# Patient Record
Sex: MALE | Race: WHITE | Employment: OTHER | URBAN - METROPOLITAN AREA
[De-identification: names, ages, dates, MRNs, and addresses within clinical notes are randomized per-mention and may not be internally consistent; named-entity substitution may affect disease eponyms.]

---

## 2017-01-01 ENCOUNTER — APPOINTMENT (OUTPATIENT)
Dept: CT IMAGING | Facility: HOSPITAL | Age: 62
DRG: 177 | End: 2017-01-01
Attending: EMERGENCY MEDICINE
Payer: MEDICARE

## 2017-01-01 ENCOUNTER — HOSPITAL ENCOUNTER (INPATIENT)
Facility: HOSPITAL | Age: 62
LOS: 3 days | Discharge: HOSPICE | DRG: 177 | End: 2017-01-01
Attending: EMERGENCY MEDICINE | Admitting: FAMILY MEDICINE
Payer: MEDICARE

## 2017-01-01 ENCOUNTER — APPOINTMENT (OUTPATIENT)
Dept: GENERAL RADIOLOGY | Facility: HOSPITAL | Age: 62
DRG: 177 | End: 2017-01-01
Attending: INTERNAL MEDICINE
Payer: MEDICARE

## 2017-01-01 ENCOUNTER — APPOINTMENT (OUTPATIENT)
Dept: GENERAL RADIOLOGY | Facility: HOSPITAL | Age: 62
DRG: 177 | End: 2017-01-01
Payer: MEDICARE

## 2017-01-01 ENCOUNTER — APPOINTMENT (OUTPATIENT)
Dept: GENERAL RADIOLOGY | Facility: HOSPITAL | Age: 62
DRG: 177 | End: 2017-01-01
Attending: NURSE PRACTITIONER
Payer: MEDICARE

## 2017-01-01 VITALS
DIASTOLIC BLOOD PRESSURE: 62 MMHG | WEIGHT: 102.31 LBS | HEIGHT: 74 IN | OXYGEN SATURATION: 91 % | BODY MASS INDEX: 13.13 KG/M2 | HEART RATE: 96 BPM | RESPIRATION RATE: 20 BRPM | TEMPERATURE: 98 F | SYSTOLIC BLOOD PRESSURE: 103 MMHG

## 2017-01-01 DIAGNOSIS — E87.6 HYPOKALEMIA: ICD-10-CM

## 2017-01-01 DIAGNOSIS — J93.9 PNEUMOTHORAX, UNSPECIFIED TYPE: ICD-10-CM

## 2017-01-01 DIAGNOSIS — J18.9 PNEUMONIA OF BOTH LUNGS DUE TO INFECTIOUS ORGANISM, UNSPECIFIED PART OF LUNG: Primary | ICD-10-CM

## 2017-01-01 DIAGNOSIS — K22.3 ESOPHAGEAL PERFORATION: ICD-10-CM

## 2017-01-01 LAB
ALBUMIN SERPL-MCNC: 1.5 G/DL (ref 3.5–4.8)
ALBUMIN SERPL-MCNC: 1.6 G/DL (ref 3.5–4.8)
ALP LIVER SERPL-CCNC: 236 U/L (ref 45–117)
ALP LIVER SERPL-CCNC: 263 U/L (ref 45–117)
ALT SERPL-CCNC: 10 U/L (ref 17–63)
ALT SERPL-CCNC: 11 U/L (ref 17–63)
AST SERPL-CCNC: 14 U/L (ref 15–41)
AST SERPL-CCNC: 15 U/L (ref 15–41)
BASOPHILS # BLD AUTO: 0.02 X10(3) UL (ref 0–0.1)
BASOPHILS # BLD AUTO: 0.03 X10(3) UL (ref 0–0.1)
BASOPHILS NFR BLD AUTO: 0.1 %
BASOPHILS NFR BLD AUTO: 0.1 %
BILIRUB SERPL-MCNC: 0.5 MG/DL (ref 0.1–2)
BILIRUB SERPL-MCNC: 0.6 MG/DL (ref 0.1–2)
BUN BLD-MCNC: 12 MG/DL (ref 8–20)
BUN BLD-MCNC: 9 MG/DL (ref 8–20)
CALCIUM BLD-MCNC: 7.5 MG/DL (ref 8.3–10.3)
CALCIUM BLD-MCNC: 7.7 MG/DL (ref 8.3–10.3)
CHLORIDE: 92 MMOL/L (ref 101–111)
CHLORIDE: 96 MMOL/L (ref 101–111)
CO2: 39 MMOL/L (ref 22–32)
CO2: 39 MMOL/L (ref 22–32)
CREAT BLD-MCNC: 0.3 MG/DL (ref 0.7–1.3)
CREAT BLD-MCNC: 0.35 MG/DL (ref 0.7–1.3)
EOSINOPHIL # BLD AUTO: 0.02 X10(3) UL (ref 0–0.3)
EOSINOPHIL # BLD AUTO: 0.02 X10(3) UL (ref 0–0.3)
EOSINOPHIL NFR BLD AUTO: 0.1 %
EOSINOPHIL NFR BLD AUTO: 0.1 %
ERYTHROCYTE [DISTWIDTH] IN BLOOD BY AUTOMATED COUNT: 20 % (ref 11.5–16)
ERYTHROCYTE [DISTWIDTH] IN BLOOD BY AUTOMATED COUNT: 20.1 % (ref 11.5–16)
GLUCOSE BLD-MCNC: 129 MG/DL (ref 70–99)
GLUCOSE BLD-MCNC: 138 MG/DL (ref 70–99)
HCT VFR BLD AUTO: 24.4 % (ref 37–53)
HCT VFR BLD AUTO: 25.6 % (ref 37–53)
HGB BLD-MCNC: 7.4 G/DL (ref 13–17)
HGB BLD-MCNC: 8 G/DL (ref 13–17)
IMMATURE GRANULOCYTE COUNT: 0.37 X10(3) UL (ref 0–1)
IMMATURE GRANULOCYTE COUNT: 0.49 X10(3) UL (ref 0–1)
IMMATURE GRANULOCYTE RATIO %: 1.8 %
IMMATURE GRANULOCYTE RATIO %: 1.9 %
INR BLD: 1.21 (ref 0.89–1.12)
LACTIC ACID: 1.3 MMOL/L (ref 0.5–2)
LYMPHOCYTES # BLD AUTO: 0.6 X10(3) UL (ref 0.9–4)
LYMPHOCYTES # BLD AUTO: 0.77 X10(3) UL (ref 0.9–4)
LYMPHOCYTES NFR BLD AUTO: 3 %
LYMPHOCYTES NFR BLD AUTO: 3 %
M PROTEIN MFR SERPL ELPH: 5.3 G/DL (ref 6.1–8.3)
M PROTEIN MFR SERPL ELPH: 5.4 G/DL (ref 6.1–8.3)
MCH RBC QN AUTO: 28.7 PG (ref 27–33.2)
MCH RBC QN AUTO: 29.1 PG (ref 27–33.2)
MCHC RBC AUTO-ENTMCNC: 30.3 G/DL (ref 31–37)
MCHC RBC AUTO-ENTMCNC: 31.3 G/DL (ref 31–37)
MCV RBC AUTO: 93.1 FL (ref 80–99)
MCV RBC AUTO: 94.6 FL (ref 80–99)
MONOCYTES # BLD AUTO: 0.91 X10(3) UL (ref 0.1–0.6)
MONOCYTES # BLD AUTO: 1.13 X10(3) UL (ref 0.1–0.6)
MONOCYTES NFR BLD AUTO: 4.4 %
MONOCYTES NFR BLD AUTO: 4.5 %
NEUTROPHIL ABS PRELIM: 18.35 X10 (3) UL (ref 1.3–6.7)
NEUTROPHIL ABS PRELIM: 23.36 X10 (3) UL (ref 1.3–6.7)
NEUTROPHILS # BLD AUTO: 18.35 X10(3) UL (ref 1.3–6.7)
NEUTROPHILS # BLD AUTO: 23.36 X10(3) UL (ref 1.3–6.7)
NEUTROPHILS NFR BLD AUTO: 90.5 %
NEUTROPHILS NFR BLD AUTO: 90.5 %
PLATELET # BLD AUTO: 167 10(3)UL (ref 150–450)
PLATELET # BLD AUTO: 185 10(3)UL (ref 150–450)
PLATELET MORPHOLOGY: NORMAL
POTASSIUM SERPL-SCNC: 2.7 MMOL/L (ref 3.6–5.1)
POTASSIUM SERPL-SCNC: 2.9 MMOL/L (ref 3.6–5.1)
PSA SERPL DL<=0.01 NG/ML-MCNC: 15.7 SECONDS (ref 12.3–14.8)
RBC # BLD AUTO: 2.58 X10(6)UL (ref 4.3–5.7)
RBC # BLD AUTO: 2.75 X10(6)UL (ref 4.3–5.7)
RED CELL DISTRIBUTION WIDTH-SD: 67.8 FL (ref 35.1–46.3)
RED CELL DISTRIBUTION WIDTH-SD: 70 FL (ref 35.1–46.3)
SODIUM SERPL-SCNC: 136 MMOL/L (ref 136–144)
SODIUM SERPL-SCNC: 138 MMOL/L (ref 136–144)
WBC # BLD AUTO: 20.3 X10(3) UL (ref 4–13)
WBC # BLD AUTO: 25.8 X10(3) UL (ref 4–13)

## 2017-01-01 PROCEDURE — 71010 XR CHEST AP PORTABLE  (CPT=71010): CPT

## 2017-01-01 PROCEDURE — 71250 CT THORAX DX C-: CPT

## 2017-01-01 PROCEDURE — 99223 1ST HOSP IP/OBS HIGH 75: CPT | Performed by: INTERNAL MEDICINE

## 2017-01-01 RX ORDER — ENOXAPARIN SODIUM 100 MG/ML
40 INJECTION SUBCUTANEOUS NIGHTLY
Status: DISCONTINUED | OUTPATIENT
Start: 2017-01-01 | End: 2017-01-01

## 2017-01-01 RX ORDER — POTASSIUM CHLORIDE 14.9 MG/ML
20 INJECTION INTRAVENOUS ONCE
Status: COMPLETED | OUTPATIENT
Start: 2017-01-01 | End: 2017-01-01

## 2017-01-01 RX ORDER — SCOLOPAMINE TRANSDERMAL SYSTEM 1 MG/1
1 PATCH, EXTENDED RELEASE TRANSDERMAL
Qty: 10 PATCH | Refills: 0 | Status: SHIPPED | OUTPATIENT
Start: 2017-01-01 | End: 2017-02-05

## 2017-01-01 RX ORDER — ACETAMINOPHEN 10 MG/ML
1000 INJECTION, SOLUTION INTRAVENOUS EVERY 6 HOURS PRN
Status: DISCONTINUED | OUTPATIENT
Start: 2017-01-01 | End: 2017-01-01

## 2017-01-01 RX ORDER — IPRATROPIUM BROMIDE AND ALBUTEROL SULFATE 2.5; .5 MG/3ML; MG/3ML
3 SOLUTION RESPIRATORY (INHALATION) EVERY 4 HOURS PRN
Status: DISCONTINUED | OUTPATIENT
Start: 2017-01-01 | End: 2017-01-01

## 2017-01-01 RX ORDER — DIGOXIN 125 MCG
125 TABLET ORAL DAILY
Status: DISCONTINUED | OUTPATIENT
Start: 2017-01-01 | End: 2017-01-01

## 2017-01-01 RX ORDER — AMOXICILLIN AND CLAVULANATE POTASSIUM 875; 125 MG/1; MG/1
1 TABLET, FILM COATED ORAL 2 TIMES DAILY
Qty: 20 TABLET | Refills: 0 | Status: SHIPPED | OUTPATIENT
Start: 2017-01-01 | End: 2017-01-16

## 2017-01-01 RX ORDER — LORAZEPAM 2 MG/ML
0.5 INJECTION INTRAMUSCULAR EVERY 4 HOURS PRN
Status: DISCONTINUED | OUTPATIENT
Start: 2017-01-01 | End: 2017-01-01

## 2017-01-01 RX ORDER — SODIUM CHLORIDE 9 MG/ML
1000 INJECTION, SOLUTION INTRAVENOUS ONCE
Status: COMPLETED | OUTPATIENT
Start: 2017-01-01 | End: 2017-01-01

## 2017-01-01 RX ORDER — METOPROLOL TARTRATE 50 MG/1
50 TABLET, FILM COATED ORAL 2 TIMES DAILY
Status: DISCONTINUED | OUTPATIENT
Start: 2017-01-01 | End: 2017-01-01

## 2017-01-01 RX ORDER — POTASSIUM CHLORIDE 29.8 MG/ML
40 INJECTION INTRAVENOUS ONCE
Status: COMPLETED | OUTPATIENT
Start: 2017-01-01 | End: 2017-01-01

## 2017-01-01 RX ORDER — MORPHINE SULFATE 2 MG/ML
1 INJECTION, SOLUTION INTRAMUSCULAR; INTRAVENOUS
Status: DISCONTINUED | OUTPATIENT
Start: 2017-01-01 | End: 2017-01-01

## 2017-01-01 RX ORDER — ASPIRIN 81 MG/1
324 TABLET, CHEWABLE ORAL ONCE
Status: COMPLETED | OUTPATIENT
Start: 2017-01-01 | End: 2017-01-01

## 2017-01-01 RX ORDER — IPRATROPIUM BROMIDE AND ALBUTEROL SULFATE 2.5; .5 MG/3ML; MG/3ML
3 SOLUTION RESPIRATORY (INHALATION)
Status: DISCONTINUED | OUTPATIENT
Start: 2017-01-01 | End: 2017-01-01

## 2017-01-01 RX ORDER — LORAZEPAM 2 MG/ML
2 INJECTION INTRAMUSCULAR EVERY 4 HOURS PRN
Status: DISCONTINUED | OUTPATIENT
Start: 2017-01-01 | End: 2017-01-01

## 2017-01-01 RX ORDER — SCOLOPAMINE TRANSDERMAL SYSTEM 1 MG/1
1 PATCH, EXTENDED RELEASE TRANSDERMAL
Status: DISCONTINUED | OUTPATIENT
Start: 2017-01-01 | End: 2017-01-01

## 2017-01-01 RX ORDER — LABETALOL HYDROCHLORIDE 5 MG/ML
10 INJECTION, SOLUTION INTRAVENOUS EVERY 4 HOURS PRN
Status: DISCONTINUED | OUTPATIENT
Start: 2017-01-01 | End: 2017-01-01

## 2017-01-01 RX ORDER — DEXTROSE AND SODIUM CHLORIDE 5; .45 G/100ML; G/100ML
INJECTION, SOLUTION INTRAVENOUS CONTINUOUS
Status: DISCONTINUED | OUTPATIENT
Start: 2017-01-01 | End: 2017-01-01

## 2017-01-01 RX ORDER — POTASSIUM CHLORIDE 3 G/15ML
40 SOLUTION ORAL DAILY
Status: DISCONTINUED | OUTPATIENT
Start: 2017-01-01 | End: 2017-01-01

## 2017-01-01 RX ORDER — LORAZEPAM 2 MG/ML
1 INJECTION INTRAMUSCULAR EVERY 4 HOURS PRN
Status: DISCONTINUED | OUTPATIENT
Start: 2017-01-01 | End: 2017-01-01

## 2017-01-03 PROBLEM — K22.3 ESOPHAGEAL PERFORATION: Status: ACTIVE | Noted: 2017-01-01

## 2017-01-03 PROBLEM — J18.9 PNEUMONIA OF BOTH LUNGS DUE TO INFECTIOUS ORGANISM: Status: ACTIVE | Noted: 2017-01-01

## 2017-01-03 PROBLEM — J93.9 PNEUMOTHORAX, UNSPECIFIED TYPE: Status: ACTIVE | Noted: 2017-01-01

## 2017-01-03 PROBLEM — J18.9 PNEUMONIA OF BOTH LUNGS DUE TO INFECTIOUS ORGANISM, UNSPECIFIED PART OF LUNG: Status: ACTIVE | Noted: 2017-01-01

## 2017-01-03 NOTE — ED INITIAL ASSESSMENT (HPI)
Just dc'd from hospital last week with pneumonia. Continued cough.   Feeling increasingly more weak.  sts that he is dehydrated

## 2017-01-04 NOTE — CONSULTS
GASTROENTEROLOGY CONSULTATION  Jakob Crouch MD    Department of Gastroenterology  [address] Patient Status:  Inpatient    2/15/1955 MRN [id number]   [facility] 4SW-A Attending Linda Carlisle MD   [facility] Da the lower portion at the level of T11, spanning the central esophagus, with portions in the upper as well as lower esophagus.  In spite of the stent, the esophagus is markedly   dilated from its origin, although way to its distal aspect, especially in the u lucencies in the right mid-lower paraesophageal mediastinum, directly contiguous with the lateral wall of the esophagus, containing small densities,   which may be some retained material from the recent video swallow study.  In any case, this measures on ax pneumothorax, or measuring maximally 2.6 CM at the left lung apex. No signs of tension.   4. Widespread esophageal abnormality, including the presence of esophageal stent, but in spite of the stent there is considerable material grossly distending the esoph Relation Age of Onset   • Heart Disorder Father      mi x 2   • Other Father      pulmonary embolism   • Cancer Mother      pancreatic cancer   • Lipids Brother    • Hypertension Brother       reports that he has been smoking Cigarettes.   He has a 150 pack lymph nodes. Allergy: Denies medication allergy, latex/rubber allergy, anaphylactic or other reaction to anesthesia, food allergy. Eyes: Denies blurred/double vision, eye disease, glasses or contacts, glaucoma.   ENT: Denies nose or gums bleeding, mouth stent migration. Prognosis is very poor. Pt is a very poor endoscopic candidate. Manipulation of the stent or placement of another stent is likely to exacerbate the perforation and highly risky for further complications. Plan:  1.  Pt is agreeable to

## 2017-01-04 NOTE — PLAN OF CARE
DEATH & DYING    • Pt/Family communicate acceptance of impending death and feel psychological comfort and peace Not Progressing          COPING    • Pt/Family able to verbalize concerns and demonstrate effective coping strategies Not Progressing          G

## 2017-01-04 NOTE — CM/SW NOTE
01/04/17 1600   CM/SW Referral Data   Referral Source Physician   Reason for Referral Other  (Placement)   Readmission Assessment   Pt. received education on diagnoses at time of discharge?  Yes   Did you know who and how to call someone if you felt wors previous hospitalization Yes   Did you understand your discharge instructions? Yes   Were medications taken as indicated on discharge instructions? Yes   Pt's level of independence at discharge? Some assist (mod)   Pt's living situation prior to admission? appointment? Yes   ---- F/U appt: How many days after discharge was follow-up appointment? 0-7 days   Was full assessment completed by SW/CM on prior admission? Yes   Was the recommended discharge plan achieved? Yes   Was pt. discharged w/out services?  No

## 2017-01-04 NOTE — CONSULTS
Pulmonary / Critical Care H&P/Consult       NAME: Britt Ground - ROOM: [facility]/769-Y - MRN: [id number] - Age: 64year old - :  2/15/1955    Date of Admission: 1/3/2017  5:00 PM  Admission Diagnosis: Hypokalemia [E87.6]  Esophageal perforation [K22.3]  Pneu 10/4/2016    Comment Procedure: ESOPHAGOGASTRODUODENOSCOPY (EGD);   Surgeon: Lois Montanez MD;  Location: [facility] ENDOSCOPY        Allergies:  No Known Allergies    Social History:    Social History   Marital Status:   Spouse Name: N/A    Years of Edu (EMLA) 2.5-2.5 % External Cream Apply 1 Application topically as needed (please apply to port 1 hour prior to chemo).  Use as directed Disp: 1 Tube Rfl: 3   dexamethasone 4 MG Oral Tab Take 2 tabs with food in AM for 2 days following chemo, then as directed wall:    No tenderness or deformity   Heart:    Regular rate and rhythm, S1 and S2 normal, no murmur, rub   or gallop   Abdomen:     Soft, non-tender, bowel sounds active all four quadrants,     no masses, no organomegaly   Extremities:   Extremities julianna the perforation but might require chest tube for management. 4. Esophageal perforation: related to malignancy  - already has esophageal stent in place  - gi consulted, unclear if there is anything that can be done such as a new stent.  Doubt he is a surgi

## 2017-01-04 NOTE — CONSULTS
[address]  [id number]  Hospital Day #1  Date of Consult: 01/04/17       Reason for Consultation: Consult requested for evaluation of palliative care needs and goals of care discussion.     H acetaminophen. Review of Systems: denies complaints; no pain, nausea, dyspnea. Does endorse hunger, but states \"they won't let me eat. \"    Physical Exam:  GEN: Awake and alert, irritable.   Answers most questions appropriately, but appears to have poo Agent Appointed: No        Pre-existing DNR/DNI Order: Yes, notify physician for order  Describe Patient Wishes: DNR    Symptoms(s): denies complaints    Psychosocial: Pt is very angry and has poor insight into his prognosis.   SW to make hospice referral.

## 2017-01-04 NOTE — ED PROVIDER NOTES
Patient Seen in: [facility] Emergency Department    History   Patient presents with:  Fatigue (constitutional, neurologic)    Stated Complaint: weakness    HPI    42-year-old male presents emergency department who states he was just DC'd from the [facility] daily.   levofloxacin 25 MG/ML Oral Solution,  Take 20 mL (500 mg total) by mouth daily. Clindamycin HCl 150 MG Oral Cap,  Take 2 capsules (300 mg total) by mouth 3 (three) times daily.    Metoprolol Tartrate 50 MG Oral Tab,  Take 1 tablet (50 mg total) b 01/03/17 1723 95 %   O2 Device 01/03/17 1723 None (Room air)       Current:/64 mmHg  Pulse 82  Temp(Src) 100.1 °F (37.8 °C)  Resp 32  Ht 188 cm (6' 2\")  Wt 46.72 kg  BMI 13.22 kg/m2  SpO2 93%        Physical Exam    Vital signs reviewed  General jesús DIFFERENTIAL WITH PLATELET    Narrative: The following orders were created for panel order CBC WITH DIFFERENTIAL WITH PLATELET.   Procedure                               Abnormality         Status                     --------- Martha Garcia MD on 1/03/2017 at 18:19     Approved by: Martha Garcia MD                   Patient will have a CT scan done. His white count is significantly climbed.   Will start him on some IV antibiotics but I do feel he will need to be admitted to th In spite of the stent, the esophagus is markedly dilated from its origin, although way to its distal aspect, especially in the upper esophagus where it measures on image 31 = 6.6 x 6.6 CM AP and transverse dimensions.  The esophagus is distended by bubbly a densities, which may be some retained material from the recent video swallow study.  In any case, this measures on axial image about 5.1 x 4.2 CM, and the lateral border of this merges into a moderate-sized consolidation of the right lower lobe, as well as esophageal stent, but in spite of the stent there is considerable material grossly distending the esophageal lumen such that there is mass effect upon the trachea.  In the upper esophagus, the material is more bubbly and aerated, in the lower esophagus the the pancreatic body on the prior study is no longer present. Activity in the bowel now appears to be within physiologic normal limits. SKELETON: The hypermetabolic lesion previously seen in the right anterior fourth rib is no longer hypermetabolic.      12/ is identified. The aorta, pulmonary arteries and great vessels are stable in caliber. Mild ectasia of the ascending thoracic aorta measuring up to 3.9 cm is noted. Extensive calcified aortoiliac atherosclerosis is noted.  MEDIASTINUM/LUCIANA:  There is been in measuring up to 6 mm (image 44, series 6). PERITONEUM/MESENTERY/OMENTUM:  There is no free fluid or free air. GI TRACT:  There is no bowel obstruction. UROGENITAL STRUCTURES: Central nonspecific prostate calcifications are noted.  The bladder is mildly dist follow-up provider specified.     Medications Prescribed:  Current Discharge Medication List        Present on Admission  Date Reviewed: 12/29/2016          ICD-10-CM Noted POA    Pneumonia of both lungs due to infectious organism J18.9 1/3/2017 Unknown

## 2017-01-04 NOTE — SLP NOTE
Pt transitioning to hospice care at this time. Will sign off. Thank you.     Brandi Devi M.S. [id number] [facility]  Pager [phone]

## 2017-01-04 NOTE — PROGRESS NOTES
ICU  Critical Care APN Progress Note    NAME: Eric Salgado - ROOM: [address] [place], [place] [address] - MRN: [id number] - Age: 64year old - :2/15/1955    History Of Present Illness:  Eric Salgado is a 64year old male with PMHx significant for esophageal cancer with stent a organomegaly  Extremities: Extremities normal, atraumatic, no cyanosis or edema,capillary refill <3 sec.     Pulses: 2+ and symmetric all extremities  Skin: Skin color pale with venous changes to lower ext, dry scaly feet and hands, poor skin turgor, no suzan 01/03/2017   TP 5.3 01/03/2017   AST 14 01/03/2017   ALT 11 01/03/2017       Assessment/Plan:  1.  Perforated esophagus on right with possible abscess and 2.6 cm apical pneumothorax without evidence of tension   -GI on consult   -currently stable, monitor

## 2017-01-04 NOTE — PLAN OF CARE
GASTROINTESTINAL - ADULT    • Maintains adequate nutritional intake (undernourished) Not Progressing        Impaired Activities of Daily Living    • Achieve highest/safest level of independence in self care Not Progressing        Impaired Functional Mobili

## 2017-01-04 NOTE — PLAN OF CARE
Seen by Medical team, including Palliative care-patient has agreed to going home with hospice and comfort care. Social work and residential hospice to arrange.  I called son, Bee Colon, and updated him with this per pt request. Patient, does not want tele/spo2 mo

## 2017-01-04 NOTE — H&P
[facility] Family Medicine History and Physical     Aziza Slot Patient Status:  Inpatient    2/15/1955 MRN [id number]   [facility] 4SW-A Attending Temitope Juárez MD   Hosp Day # 1 PCP Manpreet Llanes MD       CC: Patient presen 0.0 oz/week    0 Standard drinks or equivalent per week         Comment: once a week- beer        Fam Hx  Family History   Problem Relation Age of Onset   • Heart Disorder Father      mi x 2   • Other Father      pulmonary embolism   • Cancer Mother      p ALT  11*  10*   AST  14*  15   ALB  1.6*  1.5*       Radiology:     1/3/2017  PROCEDURE:  CT OF THE CHEST WITHOUT CONTRAST  COMPARISON:  [facility] , CT ANGIOGRAPHY, CHEST (CPT=71275), 10/03/2016, 19:53. INDICATIONS:  Weakness.   TECHNIQUE:  Unenhanced multi esophagus, although assessment is somewhat difficult because of the irregularity of esophagus. Although there is no diffuse, widespread pneumomediastinum, this may reflect some pneumomediastinum localized around the margins of the esophagus.   There is defi calcified masses in the liver, gallstones, widespread arterial calcification. There are degenerative changes in the spine. Dr. Philip Garcia was notified of the findings by telephone at 9:17 PM today. 1/3/2017  CONCLUSION:   1.  Numerous significant abnormali F/E/N  · Comfort liquids due to possible esophageal perf  · Lytes per protocol    #)Prophylaxis  · SCD  · Lovenox    #)Code Status  · DNR/DNI  · Refusing hospice  · palliative care  · Long discussions (see last office note) regarding futility of further tr

## 2017-01-04 NOTE — HOSPICE RN NOTE
Per [facility] staff, pt wanted to go home today. RN Wilfredo Jenkins said that the pt's girlfriend does not want pt to return to her home that they lived together as she would be unable to care for him. Pt was informed of this and called his girlfriend.   Returned to

## 2017-01-04 NOTE — CM/SW NOTE
SW spoke to [facility] RN. She went in to see pt to discuss home hospice and pt had learned his significant other does not want him home. They are talking on the phone.   SW to remain available after hospice eval to help with dc planning if neede

## 2017-01-04 NOTE — CONSULTS
[facility]  Report of Consultation    Ferman Saint Patient Status:  Inpatient    2/15/1955 MRN [id number]   [facility] 4SW-A Attending Lindsay Winter MD   Hosp Day # 1 PCP Cady Vasquze MD     REASON FOR CONSULTATION: x 2   • Other Father      pulmonary embolism   • Cancer Mother      pancreatic cancer   • Lipids Brother    • Hypertension Brother       reports that he has been smoking Cigarettes. He has a 150 pack-year smoking history.  He has never used smokeless tobac Lab Results  Component Value Date   WBC 20.3 01/04/2017   HGB 7.4 01/04/2017   HCT 24.4 01/04/2017   .0 01/04/2017   CREATSERUM 0.30 01/04/2017   BUN 9 01/04/2017    01/04/2017   K 2.9 01/04/2017   CL 96 01/04/2017   CO2 39.0 01/04/2017 cancer [facility])     Abdominal lymphadenopathy     Thoracic lymphadenopathy     Malignant neoplasm metastatic to right lung [facility])     Liver metastases (HCC)     Bone metastases (HCC)     Psoriasis     Malignant neoplasm of overlapping sites of esophagus ([facility] [address] opinion with Dr Sri Navarro group    2. Pneumonia and Pneumonia -   3. Anemia from chemo        Thank you for allowing me to participate in the care of your patient.     Leora Fernández MD

## 2017-01-04 NOTE — PHYSICAL THERAPY NOTE
PHYSICAL THERAPY EVALUATION - INPATIENT     Room Number: 471/471-A  Evaluation Date: 1/4/2017  Type of Evaluation: Initial  Physician Order: PT Eval and Treat    Presenting Problem: Pnuemonia  Reason for Therapy: Mobility Dysfunction and Discharge Plan EGD N/A 10/4/2016    Comment Procedure: ESOPHAGOGASTRODUODENOSCOPY (EGD);   Surgeon: Laura Aquino MD;  Location: [address] [place] [address] ENDOSCOPY       HOME SITUATION  Type of Home: House   Home Layout: Two level  Stairs to Enter : 6  Railing: Yes  Stairs to Bedroom: 0 (sl 68.66%   Standardized Score (AM-PAC Scale): 35.33   CMS Modifier (G-Code): CL    FUNCTIONAL ABILITY STATUS  Gait Assessment   Gait Assistance:  (non- amb at this time)      Comment : C/o general weakness limiting his functional mobilities    Skilled Sher Good training  Rehab Potential : Fair  Frequency (Obs): 5x/week         CURRENT GOALS    Goal #1 Patient is able to demonstrate supine - sit EOB @ level: supervision     Goal #2 Patient is able to demonstrate transfers Sit to/from Stand at assistance level: mod

## 2017-01-04 NOTE — PAYOR COMM NOTE
Attending Physician: Jose De Jesus Veliz MD    Review Type: ADMISSION   Reviewer: Liliana Pate       Date: January 4, 2017 - 8:02 AM  Payor: Emerson Miller 150 MEDICARE ADV IHP IPA  Authorization Number: N/A  Admit date: 1/3/2017  5:00 PM   Admitted from Emergency [facility] Route User    1/3/2017 1939 New Bag 3.375 g Intravenous Treasure Blake RN      potassium chloride IVPB premix 40 mEq     Date Action Dose Route User    1/4/2017 0702 New Bag 40 mEq Intravenous Kiran Eden, VICKY      potassium chloride 40 mEq in sodium DIFFERENTIAL - Abnormal; Notable for the following:     WBC 20.3 (*)     RBC 2.58 (*)     HGB 7.4 (*)     HCT 24.4 (*)     MCHC 30.3 (*)     RDW 20.0 (*)     RDW-SD 70.0 (*)     Neutrophil Absolute Prelim 18.35 (*)     Neutrophil Absolute 18.35 (*)     Lym semi-lunar shaped, measuring 3.1 x 2.5 CM, present before, but showing increased density and possibly slightly increased size. Increasing consolidation suspicious for pneumonia at the medial right    lung base.  The left chest remains clear of acute pneumon aspect, especially in the upper esophagus where it measures on image 31 = 6.6 x 6.6 CM AP and transverse dimensions.  The esophagus is distended by bubbly appearing enteric material appearing semisolid,    this material filling the lumen of the stent, but a swallow study. In any case, this measures on axial image about 5.1 x 4.2 CM, and the lateral border of this merges into a moderate-sized consolidation of the right lower lobe, as well as a right    pleural effusion.  At the periphery of the more dense compo spite of the stent there is considerable material grossly distending the esophageal lumen such that there is mass effect upon the trachea.  In the upper esophagus,    the material is more bubbly and aerated, in the lower esophagus the material is more heter

## 2017-01-04 NOTE — CM/SW NOTE
PILY spoke w/ Palliative APN Philbert Canavan, who stated pt would like home hospice w/ [facility] and reported that pt would like to be discharged today.  PILY paged [facility].     Denny Carpio, MSW Intern  Marty Mcdanielsor, [facility]

## 2017-01-04 NOTE — PLAN OF CARE
Problem: Impaired Functional Mobility  Goal: Achieve highest/safest level of mobility/gait  Interventions:  - Assess patient’s functional ability and stability  - Promote increasing activity/tolerance for mobility and gait  - Educate and engage patient/fam

## 2017-01-04 NOTE — CDS QUERY
Pneumonia Specificity  CLINICAL DOCUMENTATION CLARIFICATION FORM    Dear Doctor:  Clinical information (provided below) indicates pneumonia.  For accurate ICD-10-CM code assignment to reflect severity of illness and risk of mortality,   PLEASE (X) ALL Charlene Jung

## 2017-01-05 NOTE — CM/SW NOTE
PILY left message for Benny Modi [facility] liaison following up on insurance auth status for SNF. PILY will follow up for d/c plan.     ADDENDUM:  PILY spoke with Benny Modi from [facility] and informed pt's insurance has denied referral for [facility], pt does not have any skillable need

## 2017-01-05 NOTE — PROGRESS NOTES
[facility] Hospitalist Progress Note                                                                   [address]  2/15/1955    Interval History:  - Felt SOB overnight, improved with neb  - CV admitted with cough and report of feeling poorly    Pneumonia likely gram negative with evidence of abscess  - Likely 2/2 esophogeal perforation, concern for abscess  - Pulmonary following and CV consulted, continue Zosyn- CV to determine if candidate for

## 2017-01-05 NOTE — PLAN OF CARE
COPING    • Pt/Family able to verbalize concerns and demonstrate effective coping strategies Progressing        DEATH & DYING    • Pt/Family communicate acceptance of impending death and feel psychological comfort and peace Brianna Grubbs

## 2017-01-05 NOTE — CM/SW NOTE
D/W Brynn VINCENT. Referral to [facility] is unrealistic. Patient wants to go home but has no one to care for him. Referral sent to [facility] for evaluation for the inpatient unit, given the patients poor prognosis.

## 2017-01-05 NOTE — PLAN OF CARE
Room available. Report given. Denae-patient's partner at bedside. Updated. Awaiting transport. 1715-transport here to take patient. Kenia Larose remains at bedside.

## 2017-01-05 NOTE — OCCUPATIONAL THERAPY NOTE
OT orders received. Pt going on hospice, per RN plan is now for pt to go to facility after D/C from [facility]. Pt declines OT at this time. Will sign off.

## 2017-01-05 NOTE — PROGRESS NOTES
Patient seen and examined. CXR stable left pneumothorax  Leukocytosis improving    He is quite comfortable and non toxic in appearance. Lengthy discussion with patient, his significant other. And his son at bedside lasting in excess of 45 mins.   I

## 2017-01-05 NOTE — PROGRESS NOTES
PT TO FLOOR FROM ICU. REPORT RECEIVED. PT ARRIVED VIA BED. BELONGINGS AT [place] [address]. [employer] AT BEDSIDE. IV MORPHINE GIVEN FOR GENERALIZED PAIN. REPORT GIVEN TO ARMANDO PERRY. PLAN OF CARE UPDATED WITH PT.  PT ORIENTED TO UNIT.   CALL LIGHT WITHIN RAYMON

## 2017-01-05 NOTE — PROGRESS NOTES
[facility]  Progress Note    Edon Prior Patient Status:  Inpatient    2/15/1955 MRN [id number]   [facility] 3NE-A Attending Syeda Johnston MD   Hosp Day # 2 PCP Joleen Cho MD     Subjective:    Feeling weak  Stil

## 2017-01-05 NOTE — PROGRESS NOTES
01/04/17 1942   Clinical Encounter Type   Visited With Patient and family together   Routine Visit Follow-up   Mu-ism Encounters   Spiritual Requests During Visit / Hospitalization Sacrament of the Sick  ( set up for Father Nena Borne from [address]

## 2017-01-05 NOTE — PROGRESS NOTES
Pulmonary Progress Note     Assessment / Plan:  1.  Dyspnea / hypoxia: secondary to pneumonia, L sided ptx and perforated esophagus in the setting of copd/emphysema   - O2 as needed  - pulm toilet as able; would avoid chest pt or nebs with cpap given ptx  2

## 2017-01-05 NOTE — HOSPICE RN NOTE
Visited pt this am to follow up from visit yesterday. Spoke with PLIY Orosco and informed her of this writer's visit last night and options given. This writer spoke with pt and discussed his care and that he was on board with trying rehab.   Pt and PILY Orosco wer

## 2017-01-06 NOTE — PROGRESS NOTES
NURSING DISCHARGE NOTE    Discharged Other, (see nursing note) via Ambulance. Accompanied by Family member  Belongings Taken by patient/family.   AVS and follow up reviewed with patient and son, new medication information given and reviewed, verbalized

## 2017-01-06 NOTE — PAYOR COMM NOTE
Attending Physician: Siddhartha Multani MD    1/5    CONTINUED STAY    CXR LEFT PTX    Interval History:  - Felt SOB overnight, improved with neb  - CV to see regarding finding of likely mediastinal abscess related to esophageal perforation    T 100.5

## 2017-01-06 NOTE — PLAN OF CARE
COPING    • Pt/Family able to verbalize concerns and demonstrate effective coping strategies Adequate for Discharge        DEATH & DYING    • Pt/Family communicate acceptance of impending death and feel psychological comfort and peace Adequate for Discharg

## 2017-01-06 NOTE — PROGRESS NOTES
[facility]  Progress Note    Stas Son Patient Status:  Inpatient    2/15/1955 MRN [id number]   [facility] 3NE-A Attending Liliana Bassett MD   Hosp Day # 3 PCP Michael Petersen MD     Subjective:    Feeling better tod

## 2017-01-06 NOTE — PROGRESS NOTES
01/05/17 2008   Clinical Encounter Type   Visited With Health care provider  (nurse paged  )   Referral From Nurse  (Patient requests  to help with change of POA, Jan 6, 2017 )   Referral To

## 2017-01-06 NOTE — CONSULTS
[facility]    Report of Consultation    Chilango Verde Patient Status:  Inpatient    2/15/1955 MRN [id number]   [facility] 3NE-A Attending Harley Guardado MD   Hosp Day # 3 PCP Maximo Frost MD     Date of Admission: (peripheral artery disease) ([facility])    • Hypertension    • COPD (chronic obstructive pulmonary disease) ([facility])    • High blood pressure      no meds   • Arrhythmia      atrial fibrillation   • Glucose intolerance (pre-diabetes)    • Visual impairment      gla g in dextrose 5 % 100 mL IVPB, 3.375 g, Intravenous, Q8H  •  acetaminophen (OFIRMEV) infusion 1,000 mg, 1,000 mg, Intravenous, Q6H PRN  •  scopolamine (TRANSDERM-SCOP) 1.5 mg patch, 1 patch, Transdermal, Q72H  •  morphINE sulfate (PF) 2 MG/ML injection 1 m Glucose Latest Ref Range: 70-99 mg/dL 138 (H)   BUN Latest Ref Range: 8-20 mg/dL 9   CREATININE Latest Ref Range: 0.70-1.30 mg/dL 0.30 (L)   GFR Latest Ref Range: >=60  144   CALCIUM Latest Ref Range: 8.3-10.3 mg/dL 7.5 (L)   ALKALINE PHOSPHATASE Latest 3.0   Monocytes % Latest Units: % 4.5   Eosinophils % Latest Units: % 0.1   Basophils % Latest Units: % 0.1   Immature Granulocyte % Latest Units: % 1.8       Imaging:  CT Chest: CONCLUSION:         1.  Numerous significant abnormalities are present.      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 I will sign off and follow peripherally. Thank you for allowing me to participate in the care of your patient. Tutu Hackett M.D.   [facility]  [facility]

## 2017-01-06 NOTE — CM/SW NOTE
PILY met with pt and pt's son and had discussion surrounding d/c to [facility]'s inpt unit.  Pt agreeable with support of son for d/c to [facility]'s inpt unit, discussion had surrounding safety concern with girlfriend caring for pt is pt chose to d/c home with [facility]

## 2017-01-06 NOTE — PLAN OF CARE
COPING    • Pt/Family able to verbalize concerns and demonstrate effective coping strategies Progressing        DEATH & DYING    • Pt/Family communicate acceptance of impending death and feel psychological comfort and peace Imani Fontenot

## 2017-01-06 NOTE — CM/SW NOTE
PILY left message for [facility] RN liaison (C#[phone]) for update on Hospice meeting with patient.      ADDENDUM:  PILY met with pt, pt wants to die at home and understands that is not an option and expressed he does not want to be in a nursin

## 2017-01-06 NOTE — DISCHARGE SUMMARY
General Medicine Discharge Summary     Patient ID:  Araceli Hinojosa  64year old  2/15/1955    Admit date: 1/3/2017    Discharge date and time:  1/6/17    Attending Physician: No att. providers found procedure planned- hospice care    Metastatic Esophageal CA  - Adalsamantha Coma is his primary oncologist- recommending hospice    - Asking for second opinion- to arrange through [facility] which was received  - As above, no procedure planned- hospice care    Cons

## 2017-01-08 NOTE — CONSULTS
[facility]    PATIENT'S NAME: Mich Moscoso   ATTENDING PHYSICIAN: Andi Holden M.D.   CONSULTING PHYSICIAN: Sascha No M.D.    PATIENT ACCOUNT#:   [de-identified]    LOCATION:  3NE-A [address] [facility]  MEDICAL RECORD #:   [id number]       DATE OF BI infectious process from esophageal perforation or rupture from progression of his underlying esophageal cancer.       PAST MEDICAL HISTORY:  Significant for hypertension, chronic obstructive pulmonary disease, atrial fibrillation, peripheral vascular diseas sensory deficits. CT scan of the chest was reviewed as well as laboratory values as described above. Care was discussed with Dr. Tommie Smith as well as the patient and nursing staff at bedside.     ASSESSMENT:  A 22-year-old man with advanced metastatic es M.D.  d: 01/07/2017 23:22:34  t: 01/08/2017 03:52:15  [facility] [id number]  YASMIN/    cc: EVELYN Laughlin M.D. Lavonia Opal, M.D.

## 2017-01-08 NOTE — CM/SW NOTE
Pt d/c 01/06 to [facility] inpt unit. 01/08/17 0900   Discharge disposition   Discharged to: [facility]   Name of [address]   Patient assessed for rehabilitation services?  Yes   Discharge transportation [facility]

## 2020-04-07 NOTE — PROGRESS NOTES
01/04/17 1837   Clinical Encounter Type   Visited With Patient and family together   Routine Visit Introduction   Crisis Visit Critical care  (Decision today to move pt. to hospice)   Referral From Nurse   Referral To [address]   R Assessment:    Acute hypoxemic respiratory failure secondary to SARS-COV-2  SARS-COV-2 PNA worsening status  Betsey improving     PLAN:    CNS: sedation vacation     HEENT:  Oral care    PULMONARY:  HOB @ 45 degrees, trend procalcitonin, LDH/CK/ferritin/CRP   weaning trail on precedex   ABG in 2 hrs if remain stable will do weaning trial      CARDIOVASCULAR:     GI: GI prophylaxis Protonix                                         Feeding: NG feed     RENAL:  F/u  lytes.  Correct as needed. accurate I/O  IV fluid   INFECTIOUS DISEASE s/p HCQ   d/c vanco for now trouph high   bld cx   send nasl mRSA   on rocephine     HEMATOLOGICAL:  DVT prophylaxis.    ENDOCRINE:  Follow up FS.  Insulin protocol if needed.    MUSCULOSKELETAL: Bed rest for now    CODE STATUS: FULL CODE    DISPOSITION: Patient require continued monitoring in MICU.  poor prognosis

## (undated) NOTE — IP AVS SNAPSHOT
Patient Demographics     Address Phone E-mail Address    [address] [phone] (Home) *Preferred*  [phone] [facility]) [email or web address]      Emergency Contact(s)     Name Relation Home Work [address] Son   226- Where to Get Your Medications      These medications were sent to [facility] [address] [facility], [address] [facility] [phone], [address] [facility] [address], [address] [facility] [address]     Phone:  [phone] - [employer] Most Recent Value    Vitals 113/68 mmHg Filed at 01/06/2017 1141    Pulse 105 Filed at 01/06/2017 1141    Resp 20 Filed at 01/06/2017 1141    Temp 98.1 °F (36.7 °C) Filed at 01/06/2017 0500    SpO2 91 % Filed at 01/06/2017 1141      Patient's Most Recen Location [facility] 4SW-A Attending Ivone Infante MD   Hosp Day # 1 PCP Alvarado Peña MD       CC: Patient presents with:  Fatigue (constitutional, neurologic)      History of Present Illness:     64year old who presented to ED after Family History   Problem Relation Age of Onset   • Heart Disorder Father      mi x 2   • Other Father      pulmonary embolism   • Cancer Mother      pancreatic cancer   • Lipids Brother    • Hypertension Brother        Review of Systems: Comprehensive 12 p 1/3/2017  PROCEDURE:  CT OF THE CHEST WITHOUT CONTRAST  COMPARISON:  [facility] , CT ANGIOGRAPHY, CHEST (CPT=71275), 10/03/2016, 19:53. INDICATIONS:  Weakness.   TECHNIQUE:  Unenhanced multislice CT scanning is performed through the chest.  Dose reduction tech of the irregularity of esophagus. Although there is no diffuse, widespread pneumomediastinum, this may reflect some pneumomediastinum localized around the margins of the esophagus.   There is definite left-sided pneumothorax, measuring maximally 2.6 CM in c calcification. There are degenerative changes in the spine. Dr. Denise Song was notified of the findings by telephone at 9:17 PM today. 1/3/2017  CONCLUSION:   1. Numerous significant abnormalities are present.   2. There is now a complex process in the rig · Lytes per protocol    #)Prophylaxis  · SCD  · Lovenox    #)Code Status  · DNR/DNI  · Refusing hospice  · palliative care  · Long discussions (see last office note) regarding futility of further treatment, however, pt reluctant to stop treatment  · Erwin Schaefer • Problems with swallowing    • Esophageal reflux    • Psoriatic arthritis [facility])    • Peripheral vascular disease (HCC)    • Ureteral stone    • Cancer [facility])    • Personal history of antineoplastic chemotherapy      last chemo 3 wks ago   • Shortness of br Neck: No JVD present. No tracheal deviation present. No thyromegaly present. Cardiovascular: Normal rate, regular rhythm and normal heart sounds. Exam reveals no gallop and no friction rub. No murmur heard.   Pulmonary/Chest: Effort normal and breath portion at the level of T11, spanning the central esophagus, with portions in the upper as well as lower esophagus.  In spite of the stent, the esophagus is markedly dilated from its origin, although way to its distal aspect, especially in the upper esophag right mid-lower paraesophageal mediastinum, directly contiguous with the lateral wall of the esophagus, containing small densities, which may be some retained material from the recent video swallow study.  In any case, this measures on axial image about 5.1 maximally 2.6 CM at the left lung apex. No signs of tension.   4. Widespread esophageal abnormality, including the presence of esophageal stent, but in spite of the stent there is considerable material grossly distending the esophageal lumen such that there :  Amadou Michele MD (Physician)             [facility]    Report of Consultation    Cecilia Kline Patient Status:  Inpatient    2/15/1955 MRN [id number]   [facility] 3NE-A Attending Yg Jefferson MD   [facility] Day • Psoriasis    • Psoriatic arthritis ([facility]    • PAD (peripheral artery disease) ([facility])    • Hypertension    • COPD (chronic obstructive pulmonary disease) ([facility])    • High blood pressure      no meds   • Arrhythmia      atrial fibrillation   • Glucose intole •  phenol (CHLORASEPTIC) 1.4 % oral liquid spray, , Oral, PRN  •  Piperacillin Sod-Tazobactam So (ZOSYN) 3.375 g in dextrose 5 % 100 mL IVPB, 3.375 g, Intravenous, Q8H  •  acetaminophen (OFIRMEV) infusion 1,000 mg, 1,000 mg, Intravenous, Q6H PRN  •  scopol Laboratory Data:    Results for Maurizio Nieto (MRN [id number]) as of 1/6/2017 09:07   Ref.  Range 1/4/2017 05:30   Glucose Latest Ref Range: 70-99 mg/dL 138 (H)   BUN Latest Ref Range: 8-20 mg/dL 9   CREATININE Latest Ref Range: 0.70-1.30 mg/dL 0.30 (L) Immature Granulocyte Absolute Latest Ref Range: 0.00-1.00 x10(3) uL 0.37   Neutrophils % Latest Units: % 90.5   Lymphocytes % Latest Units: % 3.0   Monocytes % Latest Units: % 4.5   Eosinophils % Latest Units: % 0.1   Basophils % Latest Units: % 0.1   Clarice Bone metastases ([facility] [address]     Psoriasis     Malignant neoplasm of overlapping sites of esophagus [facility])     Atherosclerosis of aorta (HCC)     Abdominal aortic atherosclerosis (HCC)     Essential hypertension     Thrombocytopenia (HCC)     Aplastic anemia du situation. I agree with the recommendation of his primary oncologist for hospice. The patient expressed understanding and thanked me for my explanation. I gave him my card and I am available, as needed, for any additional questions.   I will sign off and Esophageal perforation      Past Medical History  Past Medical History   Diagnosis Date   • Psoriasis    • Psoriatic arthritis ([facility]    • PAD (peripheral artery disease) ([facility]    • Hypertension    • COPD (chronic obstructive pulmonary disease) ([facility]    • · Overall Cognitive Status:  WFL - within functional limits    RANGE OF MOTION AND STRENGTH ASSESSMENT  Upper extremity ROM and strength are within functional limits     Lower extremity ROM is within functional limits     Lower extremity strength is grossl reach;RN aware of session/findings; All patient questions and concerns addressed;SCDs in place    ASSESSMENT     Patient is a 64year old male admitted 1/3/2017 for pneumonia. Pt was just recently d/c to home from another hosp admission.  In this PT evaluati Filed:  1/5/2017  8:00 AM Note Time:  1/5/2017  7:58 AM Status:  Signed    :  Trent German OT (Occupational Therapist)           OT orders received. Pt going on hospice, per RN plan is now for pt to go to facility after D/C from [facility].  Pt declines OT

## (undated) NOTE — IP AVS SNAPSHOT
[facility] [place] [address] [place], [address] ~ [phone]                Discharge Summary   1/3/2017    Sharmin Wang           Admission Information        Provider Department    1/3/2017 Ilda Mccollum MD  3ne-A Inhale 2 puffs into the lungs 4 (four) times daily.     Yg CHAVES                                 Scopolamine 1 MG/3DAYS Pt72   Last time this was given:  1 patch on 1/4/2017  2:00 PM   Commonly known as:  TRANSDERM-SCOP   Next dose due:  1/7/1 25.0 (L) (12/25/16)  96.2 (12/25/16)  28.8 (12/25/16)  30.0 (L)  (12/25/16)  121.0 (L) (12/13/16)  10.8      Recent Hematology Lab Results (cont.)  (Last 3 results in the past 90 days)    Neutrophil % Lymphocyte % Monocyte % Eosinophil % Basophil % Prelim - If you have concerns related to behavioral health issues or thoughts of harming yourself, contact [address] [place] [address] at [phone].     - If you don’t have insurance, Noemi Liu has partnered with [employer]ha Use: Treat infections or suspected infection   Most common side effects:  Allergic reactions, rash, nausea, diarrhea   What to report to your healthcare team: Tolerance of medications, temperature, rash, itching, shortness of breath, chills, nausea, and paige